# Patient Record
(demographics unavailable — no encounter records)

---

## 2025-07-16 NOTE — DISCUSSION/SUMMARY
[FreeTextEntry1] : Lachelle is a 33-year-old -0-1-2 being seen today at 33 weeks for gestational diabetes A2.  Her obstetrical history is significant for deliveries in  of a liveborn female  weighing 6+ pounds delivered at term via normal spontaneous vaginal delivery.  Subsequent pregnancy in  a liveborn male  weighing 9+ pounds delivered at term via normal spontaneous vaginal delivery.  No problems or complications noted with either of those pregnancies.  In addition, she has had 1 first term spontaneous complete miscarriage.  A growth scan was performed today and reveals a single viable intrauterine gestation with the estimated fetal weight of 4 pounds 6 ounces consistent with the 21st percentile.  Vertex presentation with an anterior placenta seen and the amniotic fluid index is normal 11.86 cm.  Normal umbilical artery Doppler S/D ratio.  Vital signs today reveal a blood pressure of 82/60 and maternal weight is 130 pounds consistent with a BMI of 25.39 kg.  She is on 500 mg of metformin at bedtime.  Evaluation of her diabetic flowsheets reveals the majority of her fasting blood sugars continue to be greater than 90.  The majority of her postprandial blood sugars are within normal limits.  We will increase her bedtime metformin to 850 mg.  Problems and complications related to a diabetic pregnancy including fetal macrosomia, increased incidence of operative vaginal delivery and  section, shoulder dystocia with associated morbidity mortality, stillbirth and increased incidence of  intensive care admission were discussed.  Weekly  testing until delivery is recommended.  Growth scan in 4 weeks is also recommended.  Follow-up maternal-fetal medicine consultation in 4 weeks is recommended.  Delivery between 39 and 40 weeks is appropriate.  A 75 g 2-hour GCT after her postpartum visit is recommended.  All of the above was discussed with Lachelle and all of her questions were answered.  She has not received a COVID-19 vaccination.  Risks and benefits of vaccination in pregnancy were discussed and after all of her questions were answered she has declined vaccination during pregnancy.  She understands she has certain comorbidities that put her at increased risk for problems and/or complications should she have a COVID infection.  She will contact your office to discuss various treatment options should that occur.  She has a noncontributory family, medical and surgical history.  She has no known allergies to medications and denies alcohol, tobacco or drug use.  I spent a total of 41 minutes reviewing the patient's outside medical records and ultrasound reports counseling and coordinating care.  Recommendations;  1.  Continue current ADA diet and glucose monitoring. 2.  Metformin 850 mg p.o. at bedtime. 3.  Weekly  testing until delivery. 4.  Growth scan in 1 month is recommended. 5.  Delivery between 39 and 40 weeks is recommended. 6.  75 g 2-hour GCT after postpartum visit is recommended. 7.  Follow-up maternal-fetal medicine consultation in 1 month is recommended.